# Patient Record
Sex: FEMALE | Race: BLACK OR AFRICAN AMERICAN | NOT HISPANIC OR LATINO
[De-identification: names, ages, dates, MRNs, and addresses within clinical notes are randomized per-mention and may not be internally consistent; named-entity substitution may affect disease eponyms.]

---

## 2021-09-07 ENCOUNTER — NON-APPOINTMENT (OUTPATIENT)
Age: 46
End: 2021-09-07

## 2021-09-07 PROBLEM — Z00.00 ENCOUNTER FOR PREVENTIVE HEALTH EXAMINATION: Status: ACTIVE | Noted: 2021-09-07

## 2021-09-10 ENCOUNTER — APPOINTMENT (OUTPATIENT)
Dept: BREAST CENTER | Facility: CLINIC | Age: 46
End: 2021-09-10

## 2021-09-10 ENCOUNTER — TRANSCRIPTION ENCOUNTER (OUTPATIENT)
Age: 46
End: 2021-09-10

## 2021-09-10 ENCOUNTER — APPOINTMENT (OUTPATIENT)
Dept: BREAST CENTER | Facility: HOSPITAL | Age: 46
End: 2021-09-10

## 2021-09-10 ENCOUNTER — APPOINTMENT (OUTPATIENT)
Dept: BREAST CENTER | Facility: CLINIC | Age: 46
End: 2021-09-10
Payer: COMMERCIAL

## 2021-09-10 ENCOUNTER — NON-APPOINTMENT (OUTPATIENT)
Age: 46
End: 2021-09-10

## 2021-09-10 VITALS
BODY MASS INDEX: 29.33 KG/M2 | SYSTOLIC BLOOD PRESSURE: 126 MMHG | HEIGHT: 69 IN | HEART RATE: 72 BPM | DIASTOLIC BLOOD PRESSURE: 90 MMHG | WEIGHT: 198 LBS | OXYGEN SATURATION: 97 %

## 2021-09-10 DIAGNOSIS — R92.8 OTHER ABNORMAL AND INCONCLUSIVE FINDINGS ON DIAGNOSTIC IMAGING OF BREAST: ICD-10-CM

## 2021-09-10 PROCEDURE — 99204 OFFICE O/P NEW MOD 45 MIN: CPT

## 2021-09-15 PROBLEM — R92.8 ABNORMAL FINDING ON BREAST IMAGING: Status: ACTIVE | Noted: 2021-09-15

## 2021-09-15 NOTE — HISTORY OF PRESENT ILLNESS
[FreeTextEntry1] : Right breast sebaceous cyst\par Mother at age 76 and maternal cousin at 47 with breast cancer\par Mother gene tested negative\par \par 46-year-old premenopausal woman with a family history of breast cancer presents after getting a screening mammogram this past July which showed dense breast tissue and a 6 mm right upper quadrant lesion possibly skin related.  Diagnostic mammogram showed in the upper inner quadrant of the right breast an 8 mm subcutaneous nodule that on ultrasound was found to be stable 4 mm lesion at the 1 o'clock position, 18 cm from the nipple.  Patient underwent a right ultrasound-guided core biopsy which revealed a simple squamous cyst.  Since then she has had some erythema and thickening and is now on antibiotics with some improvement.  She denies any fever or chills.  She has no breast masses nor nipple discharge.  Patient's mother breast cancer at 76 and gene tested negative.  Patient has a maternal cousin with breast cancer at age 47.  Megan model showed a 2.4% 5-year and 20.2% lifetime risk of breast cancer and Hong Valeroick shows a 36.1% risk of breast cancer.  Is the patient's first breast biopsy and she is never taken hormones.

## 2021-09-15 NOTE — PHYSICAL EXAM
[No Supraclavicular Adenopathy] : no supraclavicular adenopathy [No Cervical Adenopathy] : no cervical adenopathy [Examined in the supine and seated position] : examined in the supine and seated position [Symmetrical] : symmetrical [No dominant masses] : no dominant masses in right breast  [No dominant masses] : no dominant masses left breast [No Nipple Retraction] : no left nipple retraction [No Nipple Discharge] : no left nipple discharge [No Axillary Lymphadenopathy] : no left axillary lymphadenopathy [de-identified] : In the upper outer quadrant of the right breast is a large pore with underlying firm slightly erythematous 8 mm nodule and a cord of nodularity that is tender extending approximately 2-1/2 cm to the puncture site where the biopsy was performed.

## 2021-09-29 ENCOUNTER — APPOINTMENT (OUTPATIENT)
Dept: BREAST CENTER | Facility: CLINIC | Age: 46
End: 2021-09-29

## 2021-10-08 ENCOUNTER — APPOINTMENT (OUTPATIENT)
Dept: BREAST CENTER | Facility: CLINIC | Age: 46
End: 2021-10-08
Payer: COMMERCIAL

## 2021-10-08 VITALS
DIASTOLIC BLOOD PRESSURE: 84 MMHG | HEART RATE: 76 BPM | HEIGHT: 69 IN | WEIGHT: 198 LBS | BODY MASS INDEX: 29.33 KG/M2 | OXYGEN SATURATION: 99 % | SYSTOLIC BLOOD PRESSURE: 121 MMHG

## 2021-10-08 DIAGNOSIS — N60.81 OTHER BENIGN MAMMARY DYSPLASIAS OF RIGHT BREAST: ICD-10-CM

## 2021-10-08 PROCEDURE — 11400 EXC TR-EXT B9+MARG 0.5 CM<: CPT

## 2021-10-08 PROCEDURE — 99211 OFF/OP EST MAY X REQ PHY/QHP: CPT | Mod: 25

## 2021-10-08 NOTE — PROCEDURE
[FreeTextEntry3] : After obtaining verbal consent under sterile conditions with 1% lidocaine and epinephrine for local anesthesia, an elliptical incision was made around the pore and the underlying sebaceous cyst.  This is sharply excised and submitted to pathology.  There was minimal bleeding and the incision was closed with interrupted subdermal 3-0 Vicryl followed by running subcuticular 3-0 Vicryl Steri-Strips and a sterile bandage was applied.  Well-tolerated by patient.

## 2021-10-08 NOTE — HISTORY OF PRESENT ILLNESS
[FreeTextEntry1] : Right breast sebaceous cyst\par Mother at age 76 and maternal cousin at 47 with breast cancer\par Mother gene tested negative\par \par Patient comes in for excision of a right chest wall sebaceous cyst.  She says it feels significantly better and has no fever or chills.\par \par 46-year-old premenopausal woman with a family history of breast cancer presents after getting a screening mammogram this past July which showed dense breast tissue and a 6 mm right upper quadrant lesion possibly skin related.  Diagnostic mammogram showed in the upper inner quadrant of the right breast an 8 mm subcutaneous nodule that on ultrasound was found to be stable 4 mm lesion at the 1 o'clock position, 18 cm from the nipple.  Patient underwent a right ultrasound-guided core biopsy which revealed a simple squamous cyst.  Since then she has had some erythema and thickening and is now on antibiotics with some improvement.  She denies any fever or chills.  She has no breast masses nor nipple discharge.  Patient's mother breast cancer at 76 and gene tested negative.  Patient has a maternal cousin with breast cancer at age 47.  Megan model showed a 2.4% 5-year and 20.2% lifetime risk of breast cancer and Hong Valeroick shows a 36.1% risk of breast cancer.  Is the patient's first breast biopsy and she is never taken hormones.

## 2021-10-08 NOTE — PHYSICAL EXAM
[de-identified] : Significantly reduced size and no erythema of this right chest wall sebaceous cyst.

## 2021-10-13 ENCOUNTER — NON-APPOINTMENT (OUTPATIENT)
Age: 46
End: 2021-10-13

## 2022-01-24 DIAGNOSIS — Z80.3 FAMILY HISTORY OF MALIGNANT NEOPLASM OF BREAST: ICD-10-CM

## 2022-01-25 ENCOUNTER — APPOINTMENT (OUTPATIENT)
Dept: BREAST CENTER | Facility: CLINIC | Age: 47
End: 2022-01-25
Payer: COMMERCIAL

## 2022-01-25 VITALS
SYSTOLIC BLOOD PRESSURE: 134 MMHG | WEIGHT: 198 LBS | DIASTOLIC BLOOD PRESSURE: 90 MMHG | BODY MASS INDEX: 29.33 KG/M2 | HEIGHT: 69 IN | HEART RATE: 75 BPM

## 2022-01-25 DIAGNOSIS — R92.2 INCONCLUSIVE MAMMOGRAM: ICD-10-CM

## 2022-01-25 PROCEDURE — 99213 OFFICE O/P EST LOW 20 MIN: CPT

## 2022-01-25 RX ORDER — ALPRAZOLAM 0.5 MG/1
0.5 TABLET ORAL
Qty: 2 | Refills: 0 | Status: ACTIVE | COMMUNITY
Start: 2022-01-25 | End: 1900-01-01

## 2022-01-25 NOTE — HISTORY OF PRESENT ILLNESS
[FreeTextEntry1] : Right breast sebaceous cyst\par Mother at age 76 and maternal cousin at 47 with breast cancer\par Mother gene tested negative\par \par Patient denies any breast masses or nipple discharge.\par \par 46-year-old premenopausal woman with a family history of breast cancer presents after getting a screening mammogram this past July which showed dense breast tissue and a 6 mm right upper quadrant lesion possibly skin related.  Diagnostic mammogram showed in the upper inner quadrant of the right breast an 8 mm subcutaneous nodule that on ultrasound was found to be stable 4 mm lesion at the 1 o'clock position, 18 cm from the nipple.  Patient underwent a right ultrasound-guided core biopsy which revealed a simple squamous cyst.  Since then she had had some erythema and thickening and is now on antibiotics with some improvement.  She denies any fever or chills.  She had no breast masses nor nipple discharge.  \par \par Patient's mother breast cancer at 76 and gene tested negative.  Patient has a maternal cousin with breast cancer at age 47.  Megan model showed a 2.4% 5-year and 20.2% lifetime risk of breast cancer and Hong Valeroick shows a 36.1% risk of breast cancer.  Is the patient's first breast biopsy and she is never taken hormones.

## 2022-02-23 ENCOUNTER — RESULT REVIEW (OUTPATIENT)
Age: 47
End: 2022-02-23

## 2022-02-24 DIAGNOSIS — N63.10 UNSPECIFIED LUMP IN THE RIGHT BREAST, UNSPECIFIED QUADRANT: ICD-10-CM

## 2022-08-16 ENCOUNTER — RESULT REVIEW (OUTPATIENT)
Age: 47
End: 2022-08-16

## 2022-10-19 ENCOUNTER — APPOINTMENT (OUTPATIENT)
Dept: BREAST CENTER | Facility: CLINIC | Age: 47
End: 2022-10-19

## 2022-10-19 ENCOUNTER — NON-APPOINTMENT (OUTPATIENT)
Age: 47
End: 2022-10-19

## 2022-10-19 VITALS
HEART RATE: 76 BPM | WEIGHT: 198 LBS | SYSTOLIC BLOOD PRESSURE: 137 MMHG | BODY MASS INDEX: 29.24 KG/M2 | DIASTOLIC BLOOD PRESSURE: 94 MMHG | OXYGEN SATURATION: 98 %

## 2022-10-19 DIAGNOSIS — Z80.3 FAMILY HISTORY OF MALIGNANT NEOPLASM OF BREAST: ICD-10-CM

## 2022-10-19 DIAGNOSIS — Z12.39 ENCOUNTER FOR OTHER SCREENING FOR MALIGNANT NEOPLASM OF BREAST: ICD-10-CM

## 2022-10-19 PROCEDURE — 99213 OFFICE O/P EST LOW 20 MIN: CPT

## 2022-10-19 NOTE — HISTORY OF PRESENT ILLNESS
[FreeTextEntry1] : Right breast sebaceous cyst\par Mother at age 76 and maternal cousin at 47 with breast cancer\par Mother gene tested negative\par \par Patient denies any breast masses or nipple discharge.  Last August her mammogram and ultrasound was unremarkable, BI-RADS 2.\par \par 46-year-old premenopausal woman with a family history of breast cancer presents after getting a screening mammogram this past July which showed dense breast tissue and a 6 mm right upper quadrant lesion possibly skin related.  Diagnostic mammogram showed in the upper inner quadrant of the right breast an 8 mm subcutaneous nodule that on ultrasound was found to be stable 4 mm lesion at the 1 o'clock position, 18 cm from the nipple.  Patient underwent a right ultrasound-guided core biopsy which revealed a simple squamous cyst.  Since then she had had some erythema and thickening and is now on antibiotics with some improvement.  She denies any fever or chills.  She had no breast masses nor nipple discharge.  \par \par Patient's mother breast cancer at 76 and gene tested negative.  Patient has a maternal cousin with breast cancer at age 47.  Megan model showed a 2.4% 5-year and 20.2% lifetime risk of breast cancer and Hong Valeroick shows a 36.1% risk of breast cancer.  Is the patient's first breast biopsy and she is never taken hormones.

## 2022-10-24 ENCOUNTER — RESULT REVIEW (OUTPATIENT)
Age: 47
End: 2022-10-24

## 2023-02-06 ENCOUNTER — TRANSCRIPTION ENCOUNTER (OUTPATIENT)
Age: 48
End: 2023-02-06

## 2023-10-26 ENCOUNTER — APPOINTMENT (OUTPATIENT)
Dept: BREAST CENTER | Facility: CLINIC | Age: 48
End: 2023-10-26

## 2024-11-14 ENCOUNTER — APPOINTMENT (OUTPATIENT)
Dept: BREAST CENTER | Facility: CLINIC | Age: 49
End: 2024-11-14
Payer: COMMERCIAL

## 2024-11-14 DIAGNOSIS — R92.30 INCONCLUSIVE MAMMOGRAM: ICD-10-CM

## 2024-11-14 DIAGNOSIS — R92.2 INCONCLUSIVE MAMMOGRAM: ICD-10-CM

## 2024-11-14 DIAGNOSIS — Z80.3 FAMILY HISTORY OF MALIGNANT NEOPLASM OF BREAST: ICD-10-CM

## 2024-11-14 PROCEDURE — 99213 OFFICE O/P EST LOW 20 MIN: CPT

## 2025-02-20 ENCOUNTER — RESULT REVIEW (OUTPATIENT)
Age: 50
End: 2025-02-20